# Patient Record
Sex: FEMALE | Race: WHITE
[De-identification: names, ages, dates, MRNs, and addresses within clinical notes are randomized per-mention and may not be internally consistent; named-entity substitution may affect disease eponyms.]

---

## 2018-10-25 VITALS — DIASTOLIC BLOOD PRESSURE: 79 MMHG | SYSTOLIC BLOOD PRESSURE: 165 MMHG

## 2020-03-12 ENCOUNTER — HOSPITAL ENCOUNTER (OUTPATIENT)
Dept: HOSPITAL 61 - MAMMO | Age: 50
Discharge: HOME | End: 2020-03-12
Payer: COMMERCIAL

## 2020-03-12 DIAGNOSIS — N64.59: Primary | ICD-10-CM

## 2020-03-12 PROCEDURE — 77066 DX MAMMO INCL CAD BI: CPT

## 2020-03-12 NOTE — RAD
EXAMINATION:  MAMMO RODNEY DIAG BILAT

 

History: Inverted nipples bilaterally.

 

Comparison:  5/29/2014 and 8/16/2017 screen mammographic exams. 

 

Technique: Bilateral digital diagnostic mammogram views were obtained.  

CAD was utilized. 3-D tomosynthesis images were acquired.

 

 

Findings: 

Breast Tissue Density B : There are scattered areas of fibroglandular 

density.

 

There are no dominant masses, suspicious microcalcifications, or 

architectural distortion.  Nipples have a similar appearance as on 

correlation with prior exams considering slight differences in patient 

positioning. There is no mass or distortion in the interval in the 

subareolar regions.

 

IMPRESSION:

No mammographic evidence of malignancy. Recommend routine screening.

 

BI-RADS category 1:  Negative. Clinical management of report nipple 

inversion is recommended.

 

The images were reviewed with computer aided detection.

 

Patient information is entered into the reminder system with a target due 

date for the next screening mammogram.

 

Mammography is the most sensitive method for finding small breast cancers,

but it does not detect them all and is not a substitute for careful 

clinical examination. A negative mammogram does not negate a clinically 

suspicious finding and should not result in delay in biopsying a 

clinically suspicious abnormality.

 

 "Our facility is accredited by the [employer] 

Mammography Program."

 

Electronically signed by: Lazaro Lozoya MD (3/12/2020 6:38 PM) [id number]